# Patient Record
Sex: MALE | Race: WHITE
[De-identification: names, ages, dates, MRNs, and addresses within clinical notes are randomized per-mention and may not be internally consistent; named-entity substitution may affect disease eponyms.]

---

## 2020-11-21 ENCOUNTER — HOSPITAL ENCOUNTER (EMERGENCY)
Dept: HOSPITAL 60 - LB.ED | Age: 40
LOS: 1 days | Discharge: HOME | End: 2020-11-22
Payer: MEDICARE

## 2020-11-21 DIAGNOSIS — F17.210: ICD-10-CM

## 2020-11-21 DIAGNOSIS — S99.922A: Primary | ICD-10-CM
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
